# Patient Record
Sex: MALE | Race: WHITE | NOT HISPANIC OR LATINO | URBAN - NONMETROPOLITAN AREA
[De-identification: names, ages, dates, MRNs, and addresses within clinical notes are randomized per-mention and may not be internally consistent; named-entity substitution may affect disease eponyms.]

---

## 2017-11-03 PROBLEM — H43.813: Noted: 2020-11-16

## 2017-11-03 PROBLEM — H25.13: Noted: 2017-11-03

## 2017-11-03 PROBLEM — H52.4: Noted: 2017-11-03

## 2018-09-06 NOTE — PATIENT DISCUSSION
Patient was followed by retina specialist in [place], but has recently moved here and wants to establish care to follow for [facility] AND [facility].

## 2018-10-11 NOTE — PATIENT DISCUSSION
Further testing to be done to eval for #2, could just be irregularity from myopia.  Angiod streaks not common with RP but need further testing to eval.

## 2018-10-11 NOTE — PATIENT DISCUSSION
No sign of Bone-spicule OU and O.N. does not have waxy appearance.  Rec RTC for ERG and VF/AFA/[place] OCT OU.  Pt denies any problems with night blindness.

## 2018-10-11 NOTE — PATIENT DISCUSSION
Discussed at length pt's Harpreet Crane 82 dx of RP.  Father, sister, nephew all diagnosed.  Possible will rec genetic testing but rec finding out what gene pt's sister was found to have.

## 2018-11-09 NOTE — PATIENT DISCUSSION
Mildly attenuated signal OU on ERG. HVF Wnl. color vision Wnl. Patient has fam hx RPGR. Will coordinate with Dr Barbara Duarte @ [address] [place] [address]). Pt possibly will go up to [place] this summer, can coordinate visit.

## 2019-01-22 NOTE — PATIENT DISCUSSION
Mildly attenuated signal OU on ERG. HVF Wnl. color vision Wnl. Patient has fam hx RPGR. Will coordinate with Dr Dylan Miller @ [address] [place] [address]. Pt possibly will go up to [place] this summer, can coordinate visit.

## 2020-08-07 NOTE — PATIENT DISCUSSION
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 Information given to patient, pt elects to proceed with genetic testing. Advised pt that results typically take 4-6 weeks. Recommend pt register online as well. Will see pt in 2 months to go over genetic results. Advised pt to call with any vision changes, questions or concerns.

## 2020-11-16 ENCOUNTER — IMPORTED ENCOUNTER (OUTPATIENT)
Dept: URBAN - NONMETROPOLITAN AREA CLINIC 1 | Facility: CLINIC | Age: 65
End: 2020-11-16

## 2020-11-16 PROCEDURE — 92004 COMPRE OPH EXAM NEW PT 1/>: CPT

## 2020-11-16 PROCEDURE — 92015 DETERMINE REFRACTIVE STATE: CPT

## 2020-11-16 NOTE — PATIENT DISCUSSION
Presbyopia/Myopia OUDiscussed refractive status with patient. New glasses Rx given today. Continue to monitor. [place] OUDiscussed diagnosis with patientReviewed symptoms related to cataract progressionDiscussed various treatment options with patientRecommend UV protection Patient defers treatment at this timeContinue to monitorPVD OU Discussed findings of exam in detail with the patient. The risk of retinal detachment in patients with PVDs was discussed with the patient and the warning signs of retinal detachment were carefully reviewed with the patient. The patient was warned to return to the office or contact the ophthalmologist on call immediately if they experience signs of retinal detachment or changes in vision noted fro today.  Monitor PRN; 's Notes: MR 11/3/17DFE 11/3/17

## 2021-07-02 NOTE — PATIENT DISCUSSION
Would recommend that patient's son see Dr. Butch Cruz for pediatric eye care (7years old), would then consider genetic testing and would see pt same day for follow up.

## 2021-07-02 NOTE — PATIENT DISCUSSION
Pt edu remaining unchanged. No fluid or atrophy seen on exam today. No progressive pathology. Will continue to monitor and f/u in 6 months.

## 2021-08-17 NOTE — PATIENT DISCUSSION
Would recommend that patient's son see Dr. Jillian Sprague for pediatric eye care (7years old), would then consider genetic testing and would see pt same day for follow up.

## 2022-04-10 ASSESSMENT — VISUAL ACUITY
OD_SC: 20/25+1
OS_SC: 20/25

## 2022-04-10 ASSESSMENT — TONOMETRY
OS_IOP_MMHG: 15
OD_IOP_MMHG: 14

## 2022-10-14 NOTE — PATIENT DISCUSSION
Would recommend that patient's son see Dr. Merriam Schirmer for pediatric eye care (7years old), would then consider genetic testing and would see pt same day for follow up.

## 2023-01-12 ENCOUNTER — ESTABLISHED PATIENT (OUTPATIENT)
Dept: URBAN - NONMETROPOLITAN AREA CLINIC 1 | Facility: CLINIC | Age: 68
End: 2023-01-12

## 2023-01-12 DIAGNOSIS — H52.4: ICD-10-CM

## 2023-01-12 PROCEDURE — 92015 DETERMINE REFRACTIVE STATE: CPT

## 2023-01-12 PROCEDURE — 92014 COMPRE OPH EXAM EST PT 1/>: CPT

## 2023-01-12 ASSESSMENT — TONOMETRY
OD_IOP_MMHG: 13
OS_IOP_MMHG: 13

## 2023-01-12 ASSESSMENT — VISUAL ACUITY
OS_CC: 20/25+2
OD_CC: 20/29-2
OU_CC: 20/22+2

## 2024-03-25 ENCOUNTER — ESTABLISHED PATIENT (OUTPATIENT)
Dept: URBAN - NONMETROPOLITAN AREA CLINIC 1 | Facility: CLINIC | Age: 69
End: 2024-03-25

## 2024-03-25 DIAGNOSIS — H52.4: ICD-10-CM

## 2024-03-25 PROCEDURE — 92014 COMPRE OPH EXAM EST PT 1/>: CPT

## 2024-03-25 PROCEDURE — 92015 DETERMINE REFRACTIVE STATE: CPT

## 2024-03-25 ASSESSMENT — VISUAL ACUITY
OS_CC: 20/30
OD_CC: 20/30

## 2024-03-25 ASSESSMENT — TONOMETRY
OS_IOP_MMHG: 11
OD_IOP_MMHG: 11

## 2025-03-27 ENCOUNTER — COMPREHENSIVE EXAM (OUTPATIENT)
Age: 70
End: 2025-03-27

## 2025-03-27 DIAGNOSIS — H52.4: ICD-10-CM

## 2025-03-27 PROCEDURE — 92014 COMPRE OPH EXAM EST PT 1/>: CPT

## 2025-03-27 PROCEDURE — 92015 DETERMINE REFRACTIVE STATE: CPT
